# Patient Record
Sex: MALE | Race: OTHER | Employment: FULL TIME | URBAN - METROPOLITAN AREA
[De-identification: names, ages, dates, MRNs, and addresses within clinical notes are randomized per-mention and may not be internally consistent; named-entity substitution may affect disease eponyms.]

---

## 2021-07-30 ENCOUNTER — HOSPITAL ENCOUNTER (EMERGENCY)
Age: 31
Discharge: HOME | End: 2021-07-30
Attending: EMERGENCY MEDICINE

## 2021-07-30 VITALS
DIASTOLIC BLOOD PRESSURE: 62 MMHG | RESPIRATION RATE: 16 BRPM | HEART RATE: 59 BPM | TEMPERATURE: 98.5 F | SYSTOLIC BLOOD PRESSURE: 116 MMHG | OXYGEN SATURATION: 100 %

## 2021-07-30 DIAGNOSIS — S01.112A LEFT EYELID LACERATION, INITIAL ENCOUNTER: Primary | ICD-10-CM

## 2021-07-30 PROCEDURE — 99282 EMERGENCY DEPT VISIT SF MDM: CPT

## 2021-07-30 PROCEDURE — 74011000250 HC RX REV CODE- 250: Performed by: EMERGENCY MEDICINE

## 2021-07-30 PROCEDURE — 90715 TDAP VACCINE 7 YRS/> IM: CPT | Performed by: NURSE PRACTITIONER

## 2021-07-30 PROCEDURE — 74011250636 HC RX REV CODE- 250/636: Performed by: NURSE PRACTITIONER

## 2021-07-30 PROCEDURE — 90471 IMMUNIZATION ADMIN: CPT

## 2021-07-30 RX ORDER — PROPARACAINE HYDROCHLORIDE 5 MG/ML
1 SOLUTION/ DROPS OPHTHALMIC
Status: COMPLETED | OUTPATIENT
Start: 2021-07-30 | End: 2021-07-30

## 2021-07-30 RX ORDER — ERYTHROMYCIN 5 MG/G
OINTMENT OPHTHALMIC
Qty: 3.5 G | Refills: 0 | Status: SHIPPED | OUTPATIENT
Start: 2021-07-30

## 2021-07-30 RX ADMIN — TETANUS TOXOID, REDUCED DIPHTHERIA TOXOID AND ACELLULAR PERTUSSIS VACCINE, ADSORBED 0.5 ML: 5; 2.5; 8; 8; 2.5 SUSPENSION INTRAMUSCULAR at 14:57

## 2021-07-30 RX ADMIN — FLUORESCEIN SODIUM 1 STRIP: 0.6 STRIP OPHTHALMIC at 13:51

## 2021-07-30 RX ADMIN — PROPARACAINE HYDROCHLORIDE 1 DROP: 5 SOLUTION/ DROPS OPHTHALMIC at 13:51

## 2021-07-30 NOTE — ED PROVIDER NOTES
EMERGENCY DEPARTMENT HISTORY AND PHYSICAL EXAM    1:54 PM      Date: 7/30/2021  Patient Name: Chelsy Atkinson    History of Presenting Illness     Chief Complaint   Patient presents with    Eye Pain         History Provided By: Patient    Additional History (Context): Chelsy Atkinson is a 31 y.o. male with no significant past medical history who presents with complaints of pain to the right lateral eye after his car door accidentally closed hitting him in the face. He denies any loss of consciousness or visual changes. He is unsure of his last tetanus vaccination. He does not wear contact lenses or glasses. Thought he had a little bit of blood in the eye and was concerned. PCP: None    Current Outpatient Medications   Medication Sig Dispense Refill    erythromycin (ILOTYCIN) ophthalmic ointment Instill ~1 cm ribbon into affected eye(s) 4 times daily for 7 days 3.5 g 0       Past History     Past Medical History:  History reviewed. No pertinent past medical history. Past Surgical History:  No past surgical history on file. Family History:  History reviewed. No pertinent family history. Social History:  Social History     Tobacco Use    Smoking status: Not on file   Substance Use Topics    Alcohol use: Not on file    Drug use: Not on file       Allergies:  No Known Allergies      Review of Systems       Review of Systems   Constitutional: Negative. Negative for chills and fever. HENT: Negative. Negative for congestion, ear pain and rhinorrhea. Eyes: Positive for pain. Negative for photophobia, discharge, redness, itching and visual disturbance. Respiratory: Negative. Negative for cough and shortness of breath. Cardiovascular: Negative. Negative for chest pain, palpitations and leg swelling. Gastrointestinal: Negative. Negative for abdominal pain, constipation, diarrhea, nausea and vomiting. Genitourinary: Negative. Negative for dysuria, frequency, hematuria and urgency. Musculoskeletal: Negative. Negative for back pain, gait problem, joint swelling and neck pain. Skin: Negative. Negative for rash and wound. Neurological: Negative. Negative for dizziness, seizures, speech difficulty, weakness, light-headedness and headaches. Hematological: Negative for adenopathy. Does not bruise/bleed easily. All other systems reviewed and are negative. Physical Exam     Visit Vitals  /62 (BP 1 Location: Left upper arm, BP Patient Position: At rest)   Pulse (!) 59   Temp 98.5 °F (36.9 °C)   Resp 16   SpO2 100%         Physical Exam  Vitals and nursing note reviewed. Constitutional:       General: He is not in acute distress. Appearance: Normal appearance. He is normal weight. He is not ill-appearing, toxic-appearing or diaphoretic. HENT:      Head: Normocephalic and atraumatic. Eyes:      General: Lids are everted, no foreign bodies appreciated. Vision grossly intact. Gaze aligned appropriately. No visual field deficit or scleral icterus. Right eye: No discharge. Left eye: No foreign body, discharge or hordeolum. Extraocular Movements:      Left eye: Normal extraocular motion and no nystagmus. Conjunctiva/sclera: Conjunctivae normal.      Left eye: Left conjunctiva is not injected. No chemosis, exudate or hemorrhage. Pupils: Pupils are equal, round, and reactive to light. Visual Fields: Right eye visual fields normal and left eye visual fields normal.     Cardiovascular:      Rate and Rhythm: Normal rate and regular rhythm. Pulses: Normal pulses. Heart sounds: Normal heart sounds. No murmur heard. No friction rub. No gallop. Pulmonary:      Effort: Pulmonary effort is normal. No respiratory distress. Breath sounds: Normal breath sounds. No stridor. No wheezing, rhonchi or rales. Chest:      Chest wall: No tenderness. Abdominal:      General: Abdomen is flat. Palpations: Abdomen is soft. Tenderness: There is no abdominal tenderness. Musculoskeletal:         General: Normal range of motion. Cervical back: Full passive range of motion without pain, normal range of motion and neck supple. Skin:     General: Skin is warm and dry. Capillary Refill: Capillary refill takes less than 2 seconds. Neurological:      General: No focal deficit present. Mental Status: He is alert and oriented to person, place, and time. Psychiatric:         Mood and Affect: Mood normal.         Behavior: Behavior normal.       EYE EXAM:    The OS eye was anesthetized with 2 drops of proparacaine. Patient noted immediate relief of symptoms. No gross foreign body seen with eversion of eyelids. Fluorescein stain reveals no uptake of dye. No hyphema, hypopyon, streaming, chemosis, anterior chamber bulging, or periorbital swelling, redness, dendritic lesion, or rash. No pain with EOMs. Negative Juvenal sign. Patient tolerated exam well. Diagnostic Study Results     Labs -  No results found for this or any previous visit (from the past 12 hour(s)). Radiologic Studies -   No orders to display         Medical Decision Making   I am the first provider for this patient. I reviewed available nursing notes, past medical history, past surgical history, family history and social history. Vital Signs-Reviewed the patient's vital signs. Records Reviewed: Nursing Notes and Old Medical Records (Time of Review: 1:54 PM)    Pulse Oximetry Analysis - 100% on RA- normal       ED Course: Progress Notes, Reevaluation, and Consults:  1:54 PM  Initial assessment performed. The patients presenting problems have been discussed, and they/their family are in agreement with the care plan formulated and outlined with them. I have encouraged them to ask questions as they arise throughout their visit.     Provider Notes (Medical Decision Making):     Exam is not consistent with globe rupture, iritis, foreign body, hyphema, or corneal ulcer. Vision is at baseline per patient. Do not suspect alarming uveitis preseptal or septal cellulitis or other vision threatening process. Patient does have a very small laceration internally on the lateral aspect of the left eye upper lid. No indication for wound repair at this time due to the location of the laceration and the fact that it is only 0.2 mm. No active bleeding. Tetanus shot was updated. I discussed the findings with the patient and need for close follow-up with PCP. We will treat him with erythromycin ointment which will help lubricate the eye and also help with prevent any infection in the laceration. Will obtain appropriate studies to evaluate patient's complaints and treat symptomatically. Will disposition after reassessment assuming no clinical change or worsening and appropriate response to symptomatic treatment. ER return precautions discussed. Diagnosis     Clinical Impression:   1. Left eyelid laceration, initial encounter        Disposition: Discharged home in stable condition    DISCHARGE NOTE:     Patient has been reexamined. Patient has no new complaints, changes, or physical findings. Care plan outlined and precautions discussed. Results of physical exam findings were reviewed with the patient. All medications were reviewed with the patient; will discharge home with erythromycin ophthalmic ointment. All of patient's questions and concerns were addressed. Patient was instructed and agrees to follow up with PCP, as well as to return to the ED upon further deterioration. Patient is ready to go home.     Follow-up Information     Follow up With Specialties Details Why Brianna Hernandez  Schedule an appointment as soon as possible for a visit  Follow-up from the Emergency Department [address] [place] [id number]  [phone] [id number] [facility] EMERGENCY DEPT Emergency Medicine  As needed, If symptoms worsen [address] [facility]  [phone]           Discharge Medication List as of 7/30/2021  3:07 PM      START taking these medications    Details   erythromycin (ILOTYCIN) ophthalmic ointment Instill ~1 cm ribbon into affected eye(s) 4 times daily for 7 days, Normal, Disp-3.5 g, R-0               Dictation disclaimer:  Please note that this dictation was completed with [email or web address], the computer voice recognition software. Quite often unanticipated grammatical, syntax, homophones, and other interpretive errors are inadvertently transcribed by the computer software. Please disregard these errors. Please excuse any errors that have escaped final proofreading.

## 2021-07-30 NOTE — ED NOTES
Ev Rivera is a 31 y.o. male that was discharged in stable condition. The patients diagnosis, condition and treatment were explained to  patient and aftercare instructions were given. The patient verbalized understanding. Patient armband removed and shredded.

## 2021-07-30 NOTE — Clinical Note
[address] [facility] EMERGENCY DEPT  [id number] [id number] [phone]    Work/School Note    Date: 7/30/2021    To Whom It May concern:    Dorian Cortez was seen and treated today in the emergency room by the following provider(s):  Attending Provider: Lindsay Glasgow MD  Nurse Practitioner: GREGORIO Preston. Dorian Cortez is excused from work/school on 07/30/21 and 07/31/21. He is medically clear to return to work/school on 8/1/2021.        Sincerely,          GREGORIO Mccormick

## 2021-07-30 NOTE — ED TRIAGE NOTES
Patient states he was opening his car door and the door hit him in they left eye. Patient has pain and redness to eye.